# Patient Record
Sex: FEMALE | Race: WHITE | URBAN - METROPOLITAN AREA
[De-identification: names, ages, dates, MRNs, and addresses within clinical notes are randomized per-mention and may not be internally consistent; named-entity substitution may affect disease eponyms.]

---

## 2017-01-30 ENCOUNTER — OFFICE VISIT (OUTPATIENT)
Dept: FAMILY MEDICINE | Facility: CLINIC | Age: 67
End: 2017-01-30
Payer: COMMERCIAL

## 2017-01-30 VITALS
WEIGHT: 150.5 LBS | HEIGHT: 67 IN | SYSTOLIC BLOOD PRESSURE: 135 MMHG | BODY MASS INDEX: 23.62 KG/M2 | TEMPERATURE: 96.4 F | HEART RATE: 72 BPM | OXYGEN SATURATION: 96 % | DIASTOLIC BLOOD PRESSURE: 82 MMHG

## 2017-01-30 DIAGNOSIS — Z11.59 NEED FOR HEPATITIS C SCREENING TEST: ICD-10-CM

## 2017-01-30 DIAGNOSIS — Z23 NEED FOR PROPHYLACTIC VACCINATION AND INOCULATION AGAINST INFLUENZA: ICD-10-CM

## 2017-01-30 DIAGNOSIS — Z12.11 SCREEN FOR COLON CANCER: ICD-10-CM

## 2017-01-30 DIAGNOSIS — Z12.31 VISIT FOR SCREENING MAMMOGRAM: ICD-10-CM

## 2017-01-30 DIAGNOSIS — Z23 NEED FOR VACCINATION: ICD-10-CM

## 2017-01-30 DIAGNOSIS — E04.9 GOITER: ICD-10-CM

## 2017-01-30 DIAGNOSIS — M25.50 PAIN IN JOINT, MULTIPLE SITES: Primary | ICD-10-CM

## 2017-01-30 DIAGNOSIS — M15.0 PRIMARY OSTEOARTHRITIS INVOLVING MULTIPLE JOINTS: ICD-10-CM

## 2017-01-30 DIAGNOSIS — Z78.0 ASYMPTOMATIC POSTMENOPAUSAL STATUS: ICD-10-CM

## 2017-01-30 DIAGNOSIS — Z23 NEED FOR PROPHYLACTIC VACCINATION AGAINST STREPTOCOCCUS PNEUMONIAE (PNEUMOCOCCUS): ICD-10-CM

## 2017-01-30 DIAGNOSIS — R10.13 EPIGASTRIC PAIN: ICD-10-CM

## 2017-01-30 PROCEDURE — 90471 IMMUNIZATION ADMIN: CPT | Performed by: INTERNAL MEDICINE

## 2017-01-30 PROCEDURE — 99213 OFFICE O/P EST LOW 20 MIN: CPT | Mod: 25 | Performed by: INTERNAL MEDICINE

## 2017-01-30 PROCEDURE — 90670 PCV13 VACCINE IM: CPT | Performed by: INTERNAL MEDICINE

## 2017-01-30 RX ORDER — CALCIUM CARBONATE 500(1250)
500 TABLET ORAL 2 TIMES DAILY
COMMUNITY

## 2017-01-30 RX ORDER — MULTIPLE VITAMINS W/ MINERALS TAB 9MG-400MCG
1 TAB ORAL DAILY
COMMUNITY

## 2017-01-30 NOTE — NURSING NOTE
"Chief Complaint   Patient presents with     Establish Care     Cough     Arthritis       Initial /82 mmHg  Pulse 72  Temp(Src) 96.4  F (35.8  C) (Tympanic)  Ht 5' 7\" (1.702 m)  Wt 150 lb 8 oz (68.266 kg)  BMI 23.57 kg/m2  SpO2 96%  Breastfeeding? No Estimated body mass index is 23.57 kg/(m^2) as calculated from the following:    Height as of this encounter: 5' 7\" (1.702 m).    Weight as of this encounter: 150 lb 8 oz (68.266 kg).  BP completed using cuff size: large(CORTNEY)      SHIRLEY Driver MA January 30, 2017 10:57 AM      "

## 2017-01-30 NOTE — MR AVS SNAPSHOT
After Visit Summary   1/30/2017    Ileana Cohen    MRN: [id number]           Patient Information     Date Of Birth          1950        Visit Information        Provider Department      1/30/2017 11:00 AM Isaías Jay MD [facility]        Today's Diagnoses     Pain in joint, multiple sites    -  1     Goiter         Primary osteoarthritis involving multiple joints         Screen for colon cancer         Asymptomatic postmenopausal status         Visit for screening mammogram         Need for hepatitis C screening test         Need for prophylactic vaccination and inoculation against influenza         Need for prophylactic vaccination against Streptococcus pneumoniae (pneumococcus)            Follow-ups after your visit        Additional Services     GASTROENTEROLOGY ADULT REF PROCEDURE ONLY       Last Lab Result: CREATININE (mg/dL)       Date                     Value                 06/28/2016               0.82             ----------  Body mass index is 23.57 kg/(m^2).     Needed:  No  Language:  English    Patient will be contacted to schedule procedure.     Please be aware that coverage of these services is subject to the terms and limitations of your health insurance plan.  Call member services at your health plan with any benefit or coverage questions.  Any procedures must be performed at a [place] facility OR coordinated by your clinic's referral office.    Please bring the following with you to your appointment:    (1) Any X-Rays, CTs or MRIs which have been performed.  Contact the facility where they were done to arrange for  prior to your scheduled appointment.    (2) List of current medications   (3) This referral request   (4) Any documents/labs given to you for this referral                  Who to contact     If you have questions or need follow up information about today's clinic visit or your schedule please contact [facility]  "directly at [phone].  Normal or non-critical lab and imaging results will be communicated to you by [employer], letter or phone within 4 business days after the clinic has received the results. If you do not hear from us within 7 days, please contact the clinic through [employer] or phone. If you have a critical or abnormal lab result, we will notify you by phone as soon as possible.  Submit refill requests through [employer] or call your pharmacy and they will forward the refill request to us. Please allow 3 business days for your refill to be completed.          Additional Information About Your Visit        [employer] Information     [employer] lets you send messages to your doctor, view your test results, renew your prescriptions, schedule appointments and more. To sign up, go to [email or web address] . Click on \"Log in\" on the left side of the screen, which will take you to the Welcome page. Then click on \"Sign up Now\" on the right side of the page.     You will be asked to enter the access code listed below, as well as some personal information. Please follow the directions to create your username and password.     Your access code is: [email or web address]  Expires: 2017 11:51 AM     Your access code will  in 90 days. If you need help or a new code, please call your [place] clinic or [phone].        Care EveryWhere ID     This is your Care EveryWhere ID. This could be used by other organizations to access your [place] medical records  [id number]        Your Vitals Were     Pulse Temperature Height BMI (Body Mass Index) Pulse Oximetry Breastfeeding?    72 96.4  F (35.8  C) (Tympanic) 5' 7\" (1.702 m) 23.57 kg/m2 96% No       Blood Pressure from Last 3 Encounters:   17 135/82    Weight from Last 3 Encounters:   17 150 lb 8 oz (68.266 kg)              We Performed the Following     GASTROENTEROLOGY ADULT REF PROCEDURE ONLY        Primary Care Provider    None Specified       No primary provider on " file.        Thank you!     Thank you for choosing [facility]  for your care. Our goal is always to provide you with excellent care. Hearing back from our patients is one way we can continue to improve our services. Please take a few minutes to complete the written survey that you may receive in the mail after your visit with us. Thank you!             Your Updated Medication List - Protect others around you: Learn how to safely use, store and throw away your medicines at www.disposemymeds.org.          This list is accurate as of: 1/30/17 11:51 AM.  Always use your most recent med list.                   Brand Name Dispense Instructions for use    biotin 2.5 mg/mL      Take by mouth daily       calcium carbonate 500 MG tablet    OS-MIKE 500 mg [place]. Ca     Take 500 mg by mouth 2 times daily       Multi-vitamin Tabs tablet      Take 1 tablet by mouth daily

## 2017-01-30 NOTE — PROGRESS NOTES
SUBJECTIVE:                                                    Ileana Cohen is a 66 year old female who presents to clinic today for the following health issues:      New Patient/Transfer of Care    Chief Complaint   Patient presents with     Establish Care     Cough     Arthritis     This is a pleasant 66-year-old female who tells me that she is going to move to [place] in the next few months. She was a former patient of Dr. Jose Chacon. She has multiple complaints. She describes a several year history of progressive pain, swelling, stiffness in her distal interphalangeal joints of her bilateral upper extremities. She complained of these symptoms to Dr. Chacon who diagnosed her with osteoarthritis. Also, she complains of an improving cough that has been present since the onset of a severe upper respiratory illness several weeks ago. The cough is not productive of sputum. She has multiple other complaints including intermittent epigastric abdominal pain that seems to affect her when she lays flat in her bed. The pain is mild and is not associated with weight loss, nausea, vomiting, blood in stools, melena, hematemesis.    Problem list and histories reviewed & adjusted, as indicated.  Additional history: as documented    Patient Active Problem List   Diagnosis     Goiter     Primary osteoarthritis involving multiple joints     History reviewed. No pertinent past surgical history.    Social History   Substance Use Topics     Smoking status: Never Smoker      Smokeless tobacco: Not on file     Alcohol Use: 1.8 oz/week     3 Standard drinks or equivalent per week     Family History   Problem Relation Age of Onset     Hyperlipidemia Mother      Other Cancer Mother      OSTEOPOROSIS Mother      Thyroid Disease Mother      Other Cancer Father      Other Cancer Sister      Other Cancer Sister          Current Outpatient Prescriptions   Medication Sig Dispense Refill     multivitamin, therapeutic with minerals  "(MULTI-VITAMIN) TABS tablet Take 1 tablet by mouth daily       calcium carbonate (OS-MIKE 500 MG [place]. [place]) 500 MG tablet Take 500 mg by mouth 2 times daily       biotin 2.5 mg/mL Take by mouth daily       Allergies   Allergen Reactions     Latex Rash       ROS:  Constitutional, HEENT, cardiovascular, pulmonary, gi and gu systems are negative, except as otherwise noted.    OBJECTIVE:                                                    /82 mmHg  Pulse 72  Temp(Src) 96.4  F (35.8  C) (Tympanic)  Ht 5' 7\" (1.702 m)  Wt 150 lb 8 oz (68.266 kg)  BMI 23.57 kg/m2  SpO2 96%  Breastfeeding? No  Body mass index is 23.57 kg/(m^2).  GENERAL: healthy, alert and no distress  EYES: Eyes grossly normal to inspection, PERRL and conjunctivae and sclerae normal  HENT: ear canals and TM's normal, nose and mouth without ulcers or lesions  NECK: no adenopathy, no asymmetry, masses, or scars and thyroid normal to palpation  RESP: lungs clear to auscultation - no rales, rhonchi or wheezes  CV: regular rate and rhythm, normal S1 S2, no S3 or S4, no murmur, click or rub, no peripheral edema and peripheral pulses strong  ABDOMEN: soft, nontender, no hepatosplenomegaly, no masses and bowel sounds normal  MS: no gross musculoskeletal defects noted, no edema; she has changes consistent with osteoarthritis in her bilateral DIP joints of her upper extremities  SKIN: no suspicious lesions or rashes  NEURO: Normal strength and tone, mentation intact and speech normal  PSYCH: mentation appears normal, affect normal/bright    Diagnostic Test Results:  none      ASSESSMENT/PLAN:                                                            1. Pain in joint, multiple sites  I suspect that her pain in her joints is related to osteoarthritis, but I suggested that she could have a rheumatoid factor and CCP drawn to further risk stratify for inflammatory arthritis. She declined that recommendation. I told her she could call me if she wanted a " referral to hand therapy to manage symptoms.    2. Goiter  Previously, she had been on suppressive levothyroxine for this but not for the last several years. She is not bothered by her goiter.    3. Epigastric pain  These symptoms seem most consistent with gastroesophageal reflux disease. I suggested that she try ranitidine over-the-counter at bedtime as needed for these symptoms. If that addresses her symptoms, then no further evaluation may be needed, but if that does not sufficiently address her symptoms, then we should consider EGD here or in [place] if she moves.    4. Primary osteoarthritis involving multiple joints      5. Screen for colon cancer    - GASTROENTEROLOGY ADULT REF PROCEDURE ONLY    6. Asymptomatic postmenopausal status      7. Visit for screening mammogram  She is reminded that she is due for a mammogram    8. Need for hepatitis C screening test  With  Draw    9. Need for prophylactic vaccination and inoculation against influenza  She declined this vaccine today    10. Need for prophylactic vaccination against Streptococcus pneumoniae (pneumococcus)      11. Need for vaccination    - Pneumococcal vaccine 13 valent PCV13 IM (Prevnar) [35810]  - 1st  Administration  [[id number]]    FUTURE APPOINTMENTS:       - Follow-up visit in if she doesn't move to [place], she should return to see me in 3 months to follow-up on these symptoms or sooner if needed.    Isaías Jay MD  [facility]

## 2017-02-06 ENCOUNTER — OFFICE VISIT (OUTPATIENT)
Dept: URGENT CARE | Facility: URGENT CARE | Age: 67
End: 2017-02-06
Payer: COMMERCIAL

## 2017-02-06 VITALS
WEIGHT: 150 LBS | TEMPERATURE: 97.6 F | SYSTOLIC BLOOD PRESSURE: 128 MMHG | HEART RATE: 77 BPM | BODY MASS INDEX: 23.49 KG/M2 | OXYGEN SATURATION: 96 % | DIASTOLIC BLOOD PRESSURE: 78 MMHG

## 2017-02-06 DIAGNOSIS — R22.0 LIP SWELLING: ICD-10-CM

## 2017-02-06 DIAGNOSIS — T78.40XA ALLERGIC REACTION, INITIAL ENCOUNTER: Primary | ICD-10-CM

## 2017-02-06 PROCEDURE — 99214 OFFICE O/P EST MOD 30 MIN: CPT | Mod: 25 | Performed by: FAMILY MEDICINE

## 2017-02-06 PROCEDURE — 96372 THER/PROPH/DIAG INJ SC/IM: CPT | Performed by: FAMILY MEDICINE

## 2017-02-06 RX ORDER — EPINEPHRINE 0.3 MG/.3ML
0.3 INJECTION SUBCUTANEOUS
Qty: 0.6 ML | Refills: 0 | Status: SHIPPED | OUTPATIENT
Start: 2017-02-06 | End: 2017-08-28

## 2017-02-06 RX ORDER — CETIRIZINE HYDROCHLORIDE 10 MG/1
TABLET ORAL
Qty: 1 TABLET | Refills: 0
Start: 2017-02-06 | End: 2017-02-07

## 2017-02-06 RX ORDER — EPINEPHRINE 0.3 MG/.3ML
INJECTION SUBCUTANEOUS
Qty: 0.3 ML | Refills: 0
Start: 2017-02-06 | End: 2017-02-07

## 2017-02-06 RX ORDER — METHYLPREDNISOLONE SODIUM SUCCINATE 125 MG/2ML
125 INJECTION, POWDER, LYOPHILIZED, FOR SOLUTION INTRAMUSCULAR; INTRAVENOUS ONCE
Qty: 2 ML | Refills: 0 | OUTPATIENT
Start: 2017-02-06 | End: 2017-02-06

## 2017-02-06 NOTE — MR AVS SNAPSHOT
After Visit Summary   2/6/2017    Ileana Cohen    MRN: [id number]           Patient Information     Date Of Birth          1950        Visit Information        Provider Department      2/6/2017 10:15 AM Lito Rinaldi DO [facility]        Today's Diagnoses     Allergic reaction, initial encounter    -  1     Lip swelling            Follow-ups after your visit        Additional Services     ALLERGY/ASTHMA ADULT REFERRAL       Your provider has referred you to: [facility] - [phone]  [email or web address]  FHN: [facility] - [place] [phone]  Fax:  [phone] [email or web address]  FHN: [facility] - [place] [phone]   [email or web address]  N: [facility] - [place] [phone]   [email or web address]  [place] [phone]   [email or web address]    Please be aware that coverage of these services is subject to the terms and limitations of your health insurance plan.  Call member services at your health plan with any benefit or coverage questions.      Please bring the following with you to your appointment:    (1) Any X-Rays, CTs or MRIs which have been performed.  Contact the facility where they were done to arrange for  prior to your scheduled appointment.    (2) List of current medications  (3) This referral request   (4) Any documents/labs given to you for this referral                  Your next 10 appointments already scheduled     Feb 06, 2017  1:00 PM   Office Visit with FIDEL Marquez CNP   [facility] ([facility])    [address] [place] [address] [phone]           Bring a current list of meds and any records pertaining to this visit.  For Physicals, please bring immunization records  "and any forms needing to be filled out.  Please arrive 10 minutes early to complete paperwork.              Who to contact     If you have questions or need follow up information about today's clinic visit or your schedule please contact [facility] directly at [phone].  Normal or non-critical lab and imaging results will be communicated to you by MyChart, letter or phone within 4 business days after the clinic has received the results. If you do not hear from us within 7 days, please contact the clinic through MyChart or phone. If you have a critical or abnormal lab result, we will notify you by phone as soon as possible.  Submit refill requests through [employer] or call your pharmacy and they will forward the refill request to us. Please allow 3 business days for your refill to be completed.          Additional Information About Your Visit        MyChart Information     [employer] lets you send messages to your doctor, view your test results, renew your prescriptions, schedule appointments and more. To sign up, go to [email or web address] . Click on \"Log in\" on the left side of the screen, which will take you to the Welcome page. Then click on \"Sign up Now\" on the right side of the page.     You will be asked to enter the access code listed below, as well as some personal information. Please follow the directions to create your username and password.     Your access code is: [email or web address]  Expires: 2017 11:51 AM     Your access code will  in 90 days. If you need help or a new code, please call your [place] clinic or [phone].        Care EveryWhere ID     This is your Care EveryWhere ID. This could be used by other organizations to access your [place] medical records  [id number]        Your Vitals Were     Pulse Temperature Pulse Oximetry             77 97.6  F (36.4  C) (Oral) 96%          Blood Pressure from Last 3 Encounters:   17 128/78   17 135/82    " Weight from Last 3 Encounters:   02/06/17 150 lb (68.04 kg)   01/30/17 150 lb 8 oz (68.266 kg)              We Performed the Following     ALLERGY/ASTHMA ADULT REFERRAL     C ADRENALIN EPINEPHRINE 0.1 MG INJECTION     INJECTION INTRAMUSCULAR OR SUB-Q     INJECTION INTRAMUSCULAR OR SUB-Q     METHYLPRED 125 MG SOD INJ          Today's Medication Changes          These changes are accurate as of: 2/6/17 11:45 AM.  If you have any questions, ask your nurse or doctor.               Start taking these medicines.        Dose/Directions    cetirizine 10 MG tablet   Commonly known as:  zyrTEC   Used for:  Allergic reaction, initial encounter, Lip swelling   Started by:  Lito Rinaldi DO        1 tab po in clinic   Quantity:  1 tablet   Refills:  0       * EPINEPHrine 0.3 MG/0.3ML injection   Used for:  Allergic reaction, initial encounter, Lip swelling   Started by:  Lito Rinaldi DO        1 epipen in clinic   Quantity:  0.3 mL   Refills:  0       * EPINEPHrine 0.3 MG/0.3ML injection   Commonly known as:  EPIPEN 2-UMER   Used for:  Allergic reaction, initial encounter, Lip swelling   Started by:  Lito Rinaldi DO        Dose:  0.3 mg   Inject 0.3 mLs (0.3 mg) into the muscle once as needed for anaphylaxis   Quantity:  0.6 mL   Refills:  0       methylPREDNISolone sodium succinate 125 mg/2 mL injection   Commonly known as:  solu-MEDROL   Used for:  Allergic reaction, initial encounter, Lip swelling   Started by:  Lito Rinaldi DO        Dose:  125 mg   Inject 2 mLs (125 mg) into the vein once for 1 dose   Quantity:  2 mL   Refills:  0       * Notice:  This list has 2 medication(s) that are the same as other medications prescribed for you. Read the directions carefully, and ask your doctor or other care provider to review them with you.         Where to get your medicines      These medications were sent to [facility]/pharmacy #[id number] - [place] - [address] [id number] [facility] [address]      Phone:  [phone]    - EPINEPHrine 0.3 MG/0.3ML injection      Some of these will need a paper prescription and others can be bought over the counter.  Ask your nurse if you have questions.     You don't need a prescription for these medications    - cetirizine 10 MG tablet  - EPINEPHrine 0.3 MG/0.3ML injection  - methylPREDNISolone sodium succinate 125 mg/2 mL injection             Primary Care Provider Office Phone # Fax #    Isaías Jay [phone]       [facility] [address] [facility] [address]        Thank you!     Thank you for choosing [facility]  for your care. Our goal is always to provide you with excellent care. Hearing back from our patients is one way we can continue to improve our services. Please take a few minutes to complete the written survey that you may receive in the mail after your visit with us. Thank you!             Your Updated Medication List - Protect others around you: Learn how to safely use, store and throw away your medicines at www.disposemymeds.org.          This list is accurate as of: 2/6/17 11:45 AM.  Always use your most recent med list.                   Brand Name Dispense Instructions for use    biotin 2.5 mg/mL      Take by mouth daily       calcium carbonate 500 MG tablet    OS-MIKE 500 mg [place]. Ca     Take 500 mg by mouth 2 times daily       cetirizine 10 MG tablet    zyrTEC    1 tablet    1 tab po in clinic       * EPINEPHrine 0.3 MG/0.3ML injection     0.3 mL    1 epipen in clinic       * EPINEPHrine 0.3 MG/0.3ML injection    EPIPEN 2-UMER    0.6 mL    Inject 0.3 mLs (0.3 mg) into the muscle once as needed for anaphylaxis       methylPREDNISolone sodium succinate 125 mg/2 mL injection    solu-MEDROL    2 mL    Inject 2 mLs (125 mg) into the vein once for 1 dose       Multi-vitamin Tabs tablet      Take 1 tablet by mouth daily       * Notice:  This list has 2 medication(s) that are the same as other  medications prescribed for you. Read the directions carefully, and ask your doctor or other care provider to review them with you.

## 2017-02-06 NOTE — NURSING NOTE
"Chief Complaint   Patient presents with     Derm Problem     rash on hands, wrist and arms spreading since 7:30 am this morning       Initial /78 mmHg  Pulse 77  Temp(Src) 97.6  F (36.4  C) (Oral)  Wt 150 lb (68.04 kg)  SpO2 96% Estimated body mass index is 23.49 kg/(m^2) as calculated from the following:    Height as of 1/30/17: 5' 7\" (1.702 m).    Weight as of this encounter: 150 lb (68.04 kg).  Medication Reconciliation: complete    "

## 2017-02-06 NOTE — PROGRESS NOTES
SUBJECTIVE:Ileana Cohen is a 66 year old female who presents to the clinic today for a rash and lip swelling.  Onset of rash was this am ago.   Rash is still present and worsening.   Location of the rash: generalized.  Associated symptoms include: itching.    Symptoms are moderate and rash seems to be worsening.  Therapies tried to improve the rash: none.  Previous history of a similar rash? No  Recent exposure history: none known    Past Medical History   Diagnosis Date     Cancer (H)      Skin     Primary osteoarthritis involving multiple joints 1/30/2017     Goiter      had been on suppressive levothyroxine in the past     Allergies   Allergen Reactions     Latex Rash     Social History   Substance Use Topics     Smoking status: Never Smoker      Smokeless tobacco: Not on file     Alcohol Use: 1.8 oz/week     3 Standard drinks or equivalent per week       ROS:CONSTITUTIONAL:NEGATIVE for fever, chills, change in weight  ENT/MOUTH: NEGATIVE for ear, mouth and throat problems but does have lip swelling  RESP:NEGATIVE for significant cough or SOB    EXAM: VITALS: /78 mmHg  Pulse 77  Temp(Src) 97.6  F (36.4  C) (Oral)  Wt 150 lb (68.04 kg)  SpO2 96%  General:healthy,alert,no distress    Location: generalized     Distribution: diffuse/patchy     Lesion grouping: bilateral     Lesion type: macular and hives     Color: red with no other findings  PERTINENT EXAM: GENERAL APPEARANCE: healthy, alert and no distress  EYES: EOMI,  PERRL, conjunctiva clear  NECK: supple, non-tender to palpation, no adenopathy noted  RESP: lungs clear to auscultation - no rales, rhonchi or wheezes  CV: regular rates and rhythm, normal S1 S2, no murmur noted      ICD-10-CM    1. Allergic reaction, initial encounter T78.40XA EPINEPHrine 0.3 MG/0.3ML injection     methylPREDNISolone sodium succinate (SOLU-MEDROL) 125 mg/2 mL injection     cetirizine (ZYRTEC) 10 MG tablet     INJECTION INTRAMUSCULAR OR SUB-Q     METHYLPRED 125 MG SOD  INJ     C ADRENALIN EPINEPHRINE 0.1 MG INJECTION     INJECTION INTRAMUSCULAR OR SUB-Q     ALLERGY/ASTHMA ADULT REFERRAL     EPINEPHrine (EPIPEN 2-UMER) 0.3 MG/0.3ML injection   2. Lip swelling R22.9 EPINEPHrine 0.3 MG/0.3ML injection     methylPREDNISolone sodium succinate (SOLU-MEDROL) 125 mg/2 mL injection     cetirizine (ZYRTEC) 10 MG tablet     METHYLPRED 125 MG SOD INJ     C ADRENALIN EPINEPHRINE 0.1 MG INJECTION     INJECTION INTRAMUSCULAR OR SUB-Q     ALLERGY/ASTHMA ADULT REFERRAL     EPINEPHrine (EPIPEN 2-UMER) 0.3 MG/0.3ML injection     Feel better, less redness/itching  Cont antihistamine  Follow-up with primary clinic if not improving

## 2017-06-01 ENCOUNTER — TELEPHONE (OUTPATIENT)
Dept: FAMILY MEDICINE | Facility: CLINIC | Age: 67
End: 2017-06-01

## 2017-06-26 ENCOUNTER — OFFICE VISIT (OUTPATIENT)
Dept: FAMILY MEDICINE | Facility: CLINIC | Age: 67
End: 2017-06-26
Payer: COMMERCIAL

## 2017-06-26 VITALS
BODY MASS INDEX: 24.01 KG/M2 | HEIGHT: 67 IN | OXYGEN SATURATION: 98 % | HEART RATE: 76 BPM | TEMPERATURE: 98.1 F | DIASTOLIC BLOOD PRESSURE: 87 MMHG | WEIGHT: 153 LBS | SYSTOLIC BLOOD PRESSURE: 148 MMHG

## 2017-06-26 DIAGNOSIS — S20.212A CONTUSION OF RIB, LEFT, INITIAL ENCOUNTER: Primary | ICD-10-CM

## 2017-06-26 PROCEDURE — 99214 OFFICE O/P EST MOD 30 MIN: CPT | Performed by: PHYSICIAN ASSISTANT

## 2017-06-26 NOTE — MR AVS SNAPSHOT
"              After Visit Summary   2017    Ileana Cohen    MRN: [id number]           Patient Information     Date Of Birth          1950        Visit Information        Provider Department      2017 2:30 PM Caitie Katz PA-C [facility]        Today's Diagnoses     Contusion of rib, left, initial encounter    -  1       Follow-ups after your visit        Who to contact     If you have questions or need follow up information about today's clinic visit or your schedule please contact [facility] directly at [phone].  Normal or non-critical lab and imaging results will be communicated to you by SelSaharahart, letter or phone within 4 business days after the clinic has received the results. If you do not hear from us within 7 days, please contact the clinic through SelSaharahart or phone. If you have a critical or abnormal lab result, we will notify you by phone as soon as possible.  Submit refill requests through [employer] or call your pharmacy and they will forward the refill request to us. Please allow 3 business days for your refill to be completed.          Additional Information About Your Visit        MyChart Information     [employer] lets you send messages to your doctor, view your test results, renew your prescriptions, schedule appointments and more. To sign up, go to [email or web address] . Click on \"Log in\" on the left side of the screen, which will take you to the Welcome page. Then click on \"Sign up Now\" on the right side of the page.     You will be asked to enter the access code listed below, as well as some personal information. Please follow the directions to create your username and password.     Your access code is: [email or web address]  Expires: 2017 10:53 PM     Your access code will  in 90 days. If you need help or a new code, please call your [facility] or [phone].        Care EveryWhere ID     This is your Care EveryWhere ID. This could be used " "by other organizations to access your [place] medical records  [id number]        Your Vitals Were     Pulse Temperature Height Pulse Oximetry Breastfeeding? BMI (Body Mass Index)    76 98.1  F (36.7  C) (Tympanic) 5' 7\" (1.702 m) 98% No 23.96 kg/m2       Blood Pressure from Last 3 Encounters:   06/26/17 148/87   02/06/17 128/78   01/30/17 135/82    Weight from Last 3 Encounters:   06/26/17 153 lb (69.4 kg)   02/06/17 150 lb (68 kg)   01/30/17 150 lb 8 oz (68.3 kg)              Today, you had the following     No orders found for display       Primary Care Provider Office Phone # Fax #    Isaías Jay [phone]       [facility] [address]  [facility] [address]        Equal Access to Services     JUD MORRIS : Hadii aad ku hadasho Soomaali, waaxda luqadaha, qaybta kaalmada adeegyada, waxay adalgisain haylorn tonya sanderson . So [facility] [phone].    ATENCIÓN: Si habla español, tiene a linder disposición servicios gratuitos de asistencia lingüística. Parul al [phone].    We comply with applicable federal civil rights laws and [place] laws. We do not discriminate on the basis of race, color, national origin, age, disability sex, sexual orientation or gender identity.            Thank you!     Thank you for choosing [facility]  for your care. Our goal is always to provide you with excellent care. Hearing back from our patients is one way we can continue to improve our services. Please take a few minutes to complete the written survey that you may receive in the mail after your visit with us. Thank you!             Your Updated Medication List - Protect others around you: Learn how to safely use, store and throw away your medicines at www.disposemymeds.org.          This list is accurate as of: 6/26/17 10:53 PM.  Always use your most recent med list.                   Brand Name Dispense Instructions for use Diagnosis    ALEVE PO           biotin 2.5 mg/mL Susp      " Take by mouth daily        calcium carbonate 1250 MG tablet    OS-MIKE 500 mg [place]. Ca     Take 500 mg by mouth 2 times daily        EPINEPHrine 0.3 MG/0.3ML injection    EPIPEN 2-UMER    0.6 mL    Inject 0.3 mLs (0.3 mg) into the muscle once as needed for anaphylaxis    Allergic reaction, initial encounter, Lip swelling       Multi-vitamin Tabs tablet      Take 1 tablet by mouth daily

## 2017-06-26 NOTE — NURSING NOTE
"Chief Complaint   Patient presents with     Fall     slipped on wet bathroom floor and hit left upper back very hard on bathtub. Still having pain and discomfort, having some trouble taking deep breaths. Wants to r/o rib fx.        Initial /87 (BP Location: Right arm, Cuff Size: Adult Regular)  Pulse 76  Temp 98.1  F (36.7  C) (Tympanic)  Ht 5' 7\" (1.702 m)  Wt 153 lb (69.4 kg)  SpO2 98%  Breastfeeding? No  BMI 23.96 kg/m2 Estimated body mass index is 23.96 kg/(m^2) as calculated from the following:    Height as of this encounter: 5' 7\" (1.702 m).    Weight as of this encounter: 153 lb (69.4 kg).  Medication Reconciliation: complete   Mi Coffman MA      "

## 2017-06-26 NOTE — PROGRESS NOTES
HPI: 67 yo female here with rib pain following fall on 6/15/17  Pt was sitting on the side of the tub washing her 6 yo grand dtrs hair and slipped on the floor of the tub and hit her back/L side of ribs on the side of the tub while visiting them in [place]  Denies any LOC or hitting her head  She had pain right away and couldn't take a deep breath the rest of the night, but then really felt fine for next 3-4 days.  She felt stiff the next day but started feeling better for days but then starting last week (3 days after flying home) started to increase in pain.  Friday was the worst.  She has been taking Aleve one daily so increased that to BID on Friday  She is also applying ice but still having pain.  She is not sob and comfortable at rest  Worse with position changes.  Denies cough or hemoptysis or fever.  She had a tiny bruise initially but that resolved.    She is retiring in Dec and moving to [place] since dtr and granddtr there    Past Medical History:   Diagnosis Date     Cancer (H)     Skin     Goiter     had been on suppressive levothyroxine in the past     Primary osteoarthritis involving multiple joints 1/30/2017     History reviewed. No pertinent surgical history.  Social History   Substance Use Topics     Smoking status: Never Smoker     Smokeless tobacco: Not on file     Alcohol use 1.8 oz/week     3 Standard drinks or equivalent per week     Current Outpatient Prescriptions   Medication Sig Dispense Refill     Naproxen Sodium (ALEVE PO)        EPINEPHrine (EPIPEN 2-UMER) 0.3 MG/0.3ML injection Inject 0.3 mLs (0.3 mg) into the muscle once as needed for anaphylaxis 0.6 mL 0     multivitamin, therapeutic with minerals (MULTI-VITAMIN) TABS tablet Take 1 tablet by mouth daily       calcium carbonate (OS-MIKE 500 MG [place]. CA) 500 MG tablet Take 500 mg by mouth 2 times daily       biotin 2.5 mg/mL Take by mouth daily       Allergies   Allergen Reactions     Latex Rash     FAMILY HISTORY NOTED AND REVIEWED    PHYSICAL  "EXAM:    /87 (BP Location: Right arm, Cuff Size: Adult Regular)  Pulse 76  Temp 98.1  F (36.7  C) (Tympanic)  Ht 5' 7\" (1.702 m)  Wt 153 lb (69.4 kg)  SpO2 98%  Breastfeeding? No  BMI 23.96 kg/m2    Patient appears non toxic  Lungs: CTA bilat without crackles.  Pt able to take deep breaths and appears comfortable.  L post rib there is a tiny yellow eccymosis and slight tenderness in that area.  Heart: RRR without m/r/g.  Extr: no edema.  Psych: approp affect and mood  Moves UE well    Assessment and Plan:     (S23.687L) Contusion of rib, left, initial encounter  (primary encounter diagnosis)  Comment:  Plan: recd aleve one bid continued for now, ice 20min QID and gentle stretching. Doubt rib fracture. Suspect she has some assoc muscle spasm now since back home sleeping on older/less firm mattress.  F/u if sxs worsen or persist.      Caitie Katz PA-C            "

## 2017-08-07 ENCOUNTER — HOSPITAL ENCOUNTER (OUTPATIENT)
Dept: MAMMOGRAPHY | Facility: CLINIC | Age: 67
Discharge: HOME | End: 2017-08-07
Attending: OBSTETRICS & GYNECOLOGY | Admitting: OBSTETRICS & GYNECOLOGY
Payer: MEDICARE

## 2017-08-07 DIAGNOSIS — Z12.31 VISIT FOR SCREENING MAMMOGRAM: ICD-10-CM

## 2017-08-07 PROCEDURE — 77063 BREAST TOMOSYNTHESIS BI: CPT

## 2017-08-28 ENCOUNTER — OFFICE VISIT (OUTPATIENT)
Dept: FAMILY MEDICINE | Facility: CLINIC | Age: 67
End: 2017-08-28
Payer: COMMERCIAL

## 2017-08-28 VITALS
BODY MASS INDEX: 23.79 KG/M2 | TEMPERATURE: 97.6 F | SYSTOLIC BLOOD PRESSURE: 150 MMHG | OXYGEN SATURATION: 99 % | DIASTOLIC BLOOD PRESSURE: 83 MMHG | WEIGHT: 151.6 LBS | HEIGHT: 67 IN | HEART RATE: 67 BPM

## 2017-08-28 DIAGNOSIS — Z01.818 PREOP GENERAL PHYSICAL EXAM: Primary | ICD-10-CM

## 2017-08-28 DIAGNOSIS — Z98.82 H/O BREAST AUGMENTATION: ICD-10-CM

## 2017-08-28 DIAGNOSIS — Z41.1 ENCOUNTER FOR BREAST AUGMENTATION: ICD-10-CM

## 2017-08-28 LAB — HGB BLD-MCNC: 12.8 G/DL (ref 11.7–15.7)

## 2017-08-28 PROCEDURE — 36415 COLL VENOUS BLD VENIPUNCTURE: CPT | Performed by: NURSE PRACTITIONER

## 2017-08-28 PROCEDURE — 99214 OFFICE O/P EST MOD 30 MIN: CPT | Performed by: NURSE PRACTITIONER

## 2017-08-28 PROCEDURE — 93000 ELECTROCARDIOGRAM COMPLETE: CPT | Performed by: NURSE PRACTITIONER

## 2017-08-28 PROCEDURE — 85018 HEMOGLOBIN: CPT | Performed by: NURSE PRACTITIONER

## 2017-08-28 NOTE — LETTER
[place] [address] [facility]  [address]  [place]  [address]  Tel: [phone]    August 28, 2017    Ileana Cohen  [address] [facility] [address]        Dear Ms. Cohen,    The results of your recent Hemoglobin were very good, Ileana!      Resulted Orders   Hemoglobin   Result Value Ref Range    Hemoglobin 12.8 11.7 - 15.7 g/dL          If you have any further questions or problems, please contact our office.      Sincerely,    Jacqueline Titus NP / jona

## 2017-08-28 NOTE — MR AVS SNAPSHOT
After Visit Summary   8/28/2017    Ileana Cohen    MRN: [id number]           Patient Information     Date Of Birth          1950        Visit Information        Provider Department      8/28/2017 9:00 AM Jacqueline Titus APRN CNP [facility]        Today's Diagnoses     Preop general physical exam    -  1    H/O breast augmentation        Encounter for breast augmentation          Care Instructions      Before Your Surgery      Call your surgeon if there is any change in your health. This includes signs of a cold or flu (such as a sore throat, runny nose, cough, rash or fever).    Do not smoke, drink alcohol or take over the counter medicine (unless your surgeon or primary care doctor tells you to) for the 24 hours before and after surgery.    If you take prescribed drugs: Follow your doctor s orders about which medicines to take and which to stop until after surgery.    Eating and drinking prior to surgery: follow the instructions from your surgeon    Take a shower or bath the night before surgery. Use the soap your surgeon gave you to gently clean your skin. If you do not have soap from your surgeon, use your regular soap. Do not shave or scrub the surgery site.  Wear clean pajamas and have clean sheets on your bed.           Follow-ups after your visit        Who to contact     If you have questions or need follow up information about today's clinic visit or your schedule please contact [facility] directly at [phone].  Normal or non-critical lab and imaging results will be communicated to you by MyChart, letter or phone within 4 business days after the clinic has received the results. If you do not hear from us within 7 days, please contact the clinic through MyChart or phone. If you have a critical or abnormal lab result, we will notify you by phone as soon as possible.  Submit refill requests through [employer] or call your pharmacy and they will forward the  "refill request to us. Please allow 3 business days for your refill to be completed.          Additional Information About Your Visit        MyChart Information     [employer] lets you send messages to your doctor, view your test results, renew your prescriptions, schedule appointments and more. To sign up, go to [email or web address] . Click on \"Log in\" on the left side of the screen, which will take you to the Welcome page. Then click on \"Sign up Now\" on the right side of the page.     You will be asked to enter the access code listed below, as well as some personal information. Please follow the directions to create your username and password.     Your access code is: [email or web address]  Expires: 2017 10:53 PM     Your access code will  in 90 days. If you need help or a new code, please call your [place] clinic or [phone].        Care EveryWhere ID     This is your Care EveryWhere ID. This could be used by other organizations to access your [place] medical records  [id number]        Your Vitals Were     Pulse Temperature Height Pulse Oximetry Breastfeeding? BMI (Body Mass Index)    67 97.6  F (36.4  C) (Oral) 5' 7\" (1.702 m) 99% No 23.74 kg/m2       Blood Pressure from Last 3 Encounters:   17 150/83   17 148/87   17 128/78    Weight from Last 3 Encounters:   17 151 lb 9.6 oz (68.8 kg)   17 153 lb (69.4 kg)   17 150 lb (68 kg)              We Performed the Following     EKG 12-lead complete w/read - Clinics     Hemoglobin          Today's Medication Changes          These changes are accurate as of: 17 10:55 AM.  If you have any questions, ask your nurse or doctor.               Stop taking these medicines if you haven't already. Please contact your care team if you have questions.     EPINEPHrine 0.3 MG/0.3ML injection 2-pack   Commonly known as:  EPIPEN 2-UMER   Stopped by:  Jacqueline Titus APRN CNP                    Primary Care Provider Office Phone # Fax " #    Isaías C MD Misty [phone]       [facility] [address]  [facility] [address]        Equal Access to Services     REANNA MORRIS : Hadii barbi ku hadrizwano Sobernardoali, waaxda luqadaha, qaybta kaalmada adeegyada, katharine garcia laJookya toney. So [facility] [phone].    ATENCIÓN: Si habla español, tiene a linder disposición servicios gratuitos de asistencia lingüística. Llame al [phone].    We comply with applicable federal civil rights laws and [place] laws. We do not discriminate on the basis of race, color, national origin, age, disability sex, sexual orientation or gender identity.            Thank you!     Thank you for choosing [facility]  for your care. Our goal is always to provide you with excellent care. Hearing back from our patients is one way we can continue to improve our services. Please take a few minutes to complete the written survey that you may receive in the mail after your visit with us. Thank you!             Your Updated Medication List - Protect others around you: Learn how to safely use, store and throw away your medicines at www.disposemymeds.org.          This list is accurate as of: 8/28/17 10:55 AM.  Always use your most recent med list.                   Brand Name Dispense Instructions for use Diagnosis    ALEVE PO           biotin 2.5 mg/mL Susp      Take by mouth daily        calcium carbonate 1250 MG tablet    OS-MIKE 500 mg [place]. Ca     Take 500 mg by mouth 2 times daily        Multi-vitamin Tabs tablet      Take 1 tablet by mouth daily

## 2017-08-28 NOTE — PROGRESS NOTES
[address] [address]  [phone]  Dept: [phone]    PRE-OP EVALUATION:  Today's date: 2017    Ileana Cohen (: 1950) presents for pre-operative evaluation assessment as requested by Dr. Elias Hull.  She requires evaluation and anesthesia risk assessment prior to undergoing surgery/procedure for treatment of implants .  Proposed procedure: breast implants    Date of Surgery/ Procedure: 17  Time of Surgery/ Procedure: 1000  Hospital/Surgical Facility: [facility]  Fax number for surgical facility: [phone]  Primary Physician: Isaías Jay  Type of Anesthesia Anticipated: General    Patient has a Health Care Directive or Living Will:  NO    1. NO - Do you have a history of heart attack, stroke, stent, bypass or surgery on an artery in the head, neck, heart or legs?  2. NO - Do you ever have any pain or discomfort in your chest?  3. NO - Do you have a history of  Heart Failure?  4. NO - Are you troubled by shortness of breath when: walking on the level, up a slight hill or at night?  5. NO - Do you currently have a cold, bronchitis or other respiratory infection?  6. NO - Do you have a cough, shortness of breath or wheezing?  7. NO - Do you sometimes get pains in the calves of your legs when you walk?  8. NO - Do you or anyone in your family have previous history of blood clots?  9. NO - Do you or does anyone in your family have a serious bleeding problem such as prolonged bleeding following surgeries or cuts?  10. NO - Have you ever had problems with anemia or been told to take iron pills?  11. NO - Have you had any abnormal blood loss such as black, tarry or bloody stools, or abnormal vaginal bleeding?  12. NO - Have you ever had a blood transfusion?  13. NO - Have you or any of your relatives ever had problems with anesthesia?  14. NO - Do you have sleep apnea, excessive snoring or daytime drowsiness?  15. NO - Do you  have any prosthetic heart valves?  16. NO - Do you have prosthetic joints?  17. NO - Is there any chance that you may be pregnant?        HPI:                                                      Brief HPI related to upcoming procedure: original rohan breast augmentation 1994.  Left breast implant failure 2006        See problem list for active medical problems.  Problems all longstanding and stable, except as noted/documented.  See ROS for pertinent symptoms related to these conditions.                                                                                                  .    MEDICAL HISTORY:                                                    Patient Active Problem List    Diagnosis Date Noted     Contusion of rib, left, initial encounter 06/26/2017     Priority: Medium     Goiter 01/30/2017     Priority: Medium     Primary osteoarthritis involving multiple joints 01/30/2017     Priority: Medium      Past Medical History:   Diagnosis Date     Cancer (H)     Skin     Goiter     had been on suppressive levothyroxine in the past     Primary osteoarthritis involving multiple joints 1/30/2017     PAST SURGICAL HISTORY  Bilateral breast implant  Tubal ligation    Current Outpatient Prescriptions   Medication Sig Dispense Refill     Naproxen Sodium (ALEVE PO)        EPINEPHrine (EPIPEN 2-UMER) 0.3 MG/0.3ML injection Inject 0.3 mLs (0.3 mg) into the muscle once as needed for anaphylaxis 0.6 mL 0     multivitamin, therapeutic with minerals (MULTI-VITAMIN) TABS tablet Take 1 tablet by mouth daily       calcium carbonate (OS-MIKE 500 MG [place]. CA) 500 MG tablet Take 500 mg by mouth 2 times daily       biotin 2.5 mg/mL Take by mouth daily       OTC products: None, except as noted above    Allergies   Allergen Reactions     Latex Rash      Latex Allergy: bandaid allergy    Social History   Substance Use Topics     Smoking status: Never Smoker     Smokeless tobacco: Not on file     Alcohol use 1.8 oz/week     3 Standard  "drinks or equivalent per week     History   Drug Use No       REVIEW OF SYSTEMS:                                                    Constitutional, neuro, ENT-mild allergic rhinitis, endocrine, pulmonary, cardiac, gastrointestinal, genitourinary, musculoskeletal-osteoarthritis of shoulders and hands, integument and psychiatric systems are negative, except as otherwise noted.      EXAM:                                                    /83 (BP Location: Right arm, Patient Position: Chair, Cuff Size: Adult Regular)  Pulse 67  Temp 97.6  F (36.4  C) (Oral)  Ht 5' 7\" (1.702 m)  Wt 151 lb 9.6 oz (68.8 kg)  SpO2 99%  Breastfeeding? No  BMI 23.74 kg/m2      GENERAL APPEARANCE: healthy, alert and no distress     EYES: EOMI, PERRL     HENT: ear canals and TM's normal and nose and mouth without ulcers or lesions     NECK: no adenopathy, no asymmetry, masses, or scars and thyroid normal to palpation     RESP: lungs clear to auscultation - no rales, rhonchi or wheezes     CV: regular rates and rhythm, normal S1 S2, no S3 or S4 and no murmur, click or rub     ABDOMEN:  soft, nontender, no HSM or masses and bowel sounds normal     MS: extremities normal- no gross deformities noted, no evidence of inflammation in joints, FROM in all extremities.     SKIN: no suspicious lesions or rashes     NEURO: Normal strength and tone, sensory exam grossly normal, mentation intact and speech normal     PSYCH: mentation appears normal. and affect normal/bright    DIAGNOSTICS:                                                    EKG:  WNL    Recent Labs   Lab Test 06/28/16   POTASSIUM  4.0   CR  0.82   A1C  5.7      Results for orders placed or performed in visit on 08/28/17 (from the past 24 hour(s))   Hemoglobin   Result Value Ref Range    Hemoglobin 12.8 11.7 - 15.7 g/dL     IMPRESSION:                                                    Reason for surgery/procedure: bilateral breast implant removal and redo   Diagnosis/reason for " consult: pre op consulst    The proposed surgical procedure is considered INTERMEDIATE risk.    REVISED CARDIAC RISK INDEX  The patient has the following serious cardiovascular risks for perioperative complications such as (MI, PE, VFib and 3  AV Block):  No serious cardiac risks  INTERPRETATION: 0 risks: Class I (very low risk - 0.4% complication rate)    The patient has the following additional risks for perioperative complications:  No identified additional risks        RECOMMENDATIONS:                                                          --Patient has discontinued all medications and supplements and vitamins and NSAIDs as of 8/25  APPROVAL GIVEN to proceed with proposed procedure, without further diagnostic evaluation       Signed Electronically by: FIDEL Marquez CNP    Copy of this evaluation report is provided to requesting physician.    [facility] Preop Guidelines

## 2017-08-28 NOTE — NURSING NOTE
"Chief Complaint   Patient presents with     Pre-Op Exam       Initial /83 (BP Location: Right arm, Patient Position: Chair, Cuff Size: Adult Regular)  Pulse 67  Temp 97.6  F (36.4  C) (Oral)  Ht 5' 7\" (1.702 m)  Wt 151 lb 9.6 oz (68.8 kg)  SpO2 99%  Breastfeeding? No  BMI 23.74 kg/m2 Estimated body mass index is 23.74 kg/(m^2) as calculated from the following:    Height as of this encounter: 5' 7\" (1.702 m).    Weight as of this encounter: 151 lb 9.6 oz (68.8 kg).  Medication Reconciliation: complete.  Yasmeen De Leon CMA    "

## 2017-09-29 ENCOUNTER — OFFICE VISIT (OUTPATIENT)
Dept: FAMILY MEDICINE | Facility: CLINIC | Age: 67
End: 2017-09-29
Payer: MEDICARE

## 2017-09-29 VITALS
TEMPERATURE: 97.7 F | OXYGEN SATURATION: 96 % | BODY MASS INDEX: 24.17 KG/M2 | WEIGHT: 154 LBS | DIASTOLIC BLOOD PRESSURE: 78 MMHG | HEART RATE: 75 BPM | HEIGHT: 67 IN | SYSTOLIC BLOOD PRESSURE: 147 MMHG

## 2017-09-29 DIAGNOSIS — M65.4 RADIAL STYLOID TENOSYNOVITIS: Primary | ICD-10-CM

## 2017-09-29 DIAGNOSIS — Z23 NEED FOR PROPHYLACTIC VACCINATION AND INOCULATION AGAINST INFLUENZA: ICD-10-CM

## 2017-09-29 DIAGNOSIS — I10 BENIGN ESSENTIAL HYPERTENSION: ICD-10-CM

## 2017-09-29 PROCEDURE — G0008 ADMIN INFLUENZA VIRUS VAC: HCPCS | Performed by: NURSE PRACTITIONER

## 2017-09-29 PROCEDURE — 99214 OFFICE O/P EST MOD 30 MIN: CPT | Mod: 25 | Performed by: NURSE PRACTITIONER

## 2017-09-29 PROCEDURE — 90662 IIV NO PRSV INCREASED AG IM: CPT | Performed by: NURSE PRACTITIONER

## 2017-09-29 RX ORDER — IBUPROFEN 200 MG
400 TABLET ORAL EVERY 4 HOURS PRN
COMMUNITY

## 2017-09-29 RX ORDER — HYDROCHLOROTHIAZIDE 12.5 MG/1
12.5 TABLET ORAL DAILY
Qty: 30 TABLET | Refills: 1 | Status: SHIPPED | OUTPATIENT
Start: 2017-09-29 | End: 2017-10-16

## 2017-09-29 NOTE — NURSING NOTE
"Chief Complaint   Patient presents with     Wrist Injury     Flu Shot       Initial /78 (BP Location: Left arm, Cuff Size: Adult Large)  Pulse 75  Temp 97.7  F (36.5  C) (Tympanic)  Ht 5' 7\" (1.702 m)  Wt 154 lb (69.9 kg)  SpO2 96%  Breastfeeding? No  BMI 24.12 kg/m2 Estimated body mass index is 24.12 kg/(m^2) as calculated from the following:    Height as of this encounter: 5' 7\" (1.702 m).    Weight as of this encounter: 154 lb (69.9 kg).  Medication Reconciliation: complete  Mi Coffman MA      "

## 2017-09-29 NOTE — PATIENT INSTRUCTIONS
Begin using aleve 220mg  1-2 pills twice a day with food  Continue icing 20 min three times a day  Wear wrist splint to rest the wrist     Begin the diuretic once daily in the morning  Reduce salt in your diet  Follow up in clinic in 2 weeks to recheck BP  And have blood work done

## 2017-09-29 NOTE — MR AVS SNAPSHOT
After Visit Summary   9/29/2017    Ileana Cohen    MRN: [id number]           Patient Information     Date Of Birth          1950        Visit Information        Provider Department      9/29/2017 1:30 PM Jacqueline Titus APRN CNP [facility]        Today's Diagnoses     Benign essential hypertension    -  1    Need for prophylactic vaccination and inoculation against influenza          Care Instructions    Begin using aleve 220mg  1-2 pills twice a day with food  Continue icing 20 min three times a day  Wear wrist splint to rest the wrist     Begin the diuretic once daily in the morning  Reduce salt in your diet  Follow up in clinic in 2 weeks to recheck BP  And have blood work done          Follow-ups after your visit        Future tests that were ordered for you today     Open Future Orders        Priority Expected Expires Ordered    Basic metabolic panel  (Ca, Cl, CO2, Creat, Gluc, K, Na, BUN) Routine 10/5/2017 9/29/2018 9/29/2017            Who to contact     If you have questions or need follow up information about today's clinic visit or your schedule please contact [facility] directly at [phone].  Normal or non-critical lab and imaging results will be communicated to you by TidePoolhart, letter or phone within 4 business days after the clinic has received the results. If you do not hear from us within 7 days, please contact the clinic through Finderlyt or phone. If you have a critical or abnormal lab result, we will notify you by phone as soon as possible.  Submit refill requests through Innohub or call your pharmacy and they will forward the refill request to us. Please allow 3 business days for your refill to be completed.          Additional Information About Your Visit        TidePool[employer] Information     Innohub lets you send messages to your doctor, view your test results, renew your prescriptions, schedule appointments and more. To sign up, go to  [email or web address] . Click on \"Log in\" on the left side of the screen, which will take you to the Welcome page. Then click on \"Sign up Now\" on the right side of the page.     You will be asked to enter the access code listed below, as well as some personal information. Please follow the directions to create your username and password.     Your access code is: [email or web address]  Expires: 2017  1:46 PM     Your access code will  in 90 days. If you need help or a new code, please call your [place] clinic or [phone].        Care EveryWhere ID     This is your Care EveryWhere ID. This could be used by other organizations to access your [place] medical records  [id number]        Your Vitals Were     Pulse Temperature Height Pulse Oximetry Breastfeeding? BMI (Body Mass Index)    75 97.7  F (36.5  C) (Tympanic) 5' 7\" (1.702 m) 96% No 24.12 kg/m2       Blood Pressure from Last 3 Encounters:   17 147/78   17 150/83   17 148/87    Weight from Last 3 Encounters:   17 154 lb (69.9 kg)   17 151 lb 9.6 oz (68.8 kg)   17 153 lb (69.4 kg)              We Performed the Following          ADMIN VACCINE, FIRST [59321]     FLU VACCINE, INCREASED ANTIGEN, PRESV FREE, AGE 65+ [15471]          Today's Medication Changes          These changes are accurate as of: 17  1:58 PM.  If you have any questions, ask your nurse or doctor.               Start taking these medicines.        Dose/Directions    hydrochlorothiazide 12.5 MG Tabs tablet   Used for:  Benign essential hypertension   Started by:  Jacqueline Titus APRN CNP        Dose:  12.5 mg   Take 1 tablet (12.5 mg) by mouth daily   Quantity:  30 tablet   Refills:  1            Where to get your medicines      These medications were sent to [facility]/pharmacy #[id number] - [place], [place] - [address]  [address] [address]     Phone:  [phone]     hydrochlorothiazide 12.5 MG Tabs tablet                " Primary Care Provider Office Phone # Fax #    Isaías Jay [phone]       [facility] [address] [facility] [address]  [facility] [address]        Equal Access to Services     REANNA MORRIS : Hadii barbi ku martinao Sobernardoali, waaxda luqadaha, qaybta kaalmada adeegyada, waxay idiin hayaan tonya garcia maria e toney. So [facility] [phone].    ATENCIÓN: Si habla español, tiene a linder disposición servicios gratuitos de asistencia lingüística. Llame al [phone].    We comply with applicable federal civil rights laws and [place] laws. We do not discriminate on the basis of race, color, national origin, age, disability, sex, sexual orientation, or gender identity.            Thank you!     Thank you for choosing [facility]  for your care. Our goal is always to provide you with excellent care. Hearing back from our patients is one way we can continue to improve our services. Please take a few minutes to complete the written survey that you may receive in the mail after your visit with us. Thank you!             Your Updated Medication List - Protect others around you: Learn how to safely use, store and throw away your medicines at www.disposemymeds.org.          This list is accurate as of: 9/29/17  1:58 PM.  Always use your most recent med list.                   Brand Name Dispense Instructions for use Diagnosis    ALEVE PO           biotin 2.5 mg/mL Susp      Take by mouth daily        calcium carbonate 1250 MG tablet    OS-MIKE 500 mg [place]. Ca     Take 500 mg by mouth 2 times daily        hydrochlorothiazide 12.5 MG Tabs tablet     30 tablet    Take 1 tablet (12.5 mg) by mouth daily    Benign essential hypertension       ibuprofen 200 MG tablet    ADVIL/MOTRIN     Take 400 mg by mouth every 4 hours as needed for mild pain        Multi-vitamin Tabs tablet      Take 1 tablet by mouth daily

## 2017-09-29 NOTE — PROGRESS NOTES
"  SUBJECTIVE:   Ileana Cohen is a 67 year old female who presents to clinic today for the following health issues:    Musculoskeletal problem/pain      Duration: has had arthritis in her fingers and for the past 2 weeks has been noting some pain in the radial left wrist    Description  Location: Left wrist - heard and felt a \"pop\" yesterday when moving bedding. Has had sharp pain off and on since then.    Intensity:  10/10 when it flares up, 6-7/10 avgerage    Accompanying signs and symptoms: radiation of pain to thumb and forearm and slight swelling    History  Previous similar problem: no   Previous evaluation:  none    Precipitating or alleviating factors:  Trauma or overuse: no   Aggravating factors include: lifting, overuse and lifting/using left hand  Therapies tried and outcome: iceing    Also expressing concerns about her BP which has been running SBP>140 for several months and up to 160.  Denies headache, chest pain , fatigue or SOB  Admits to eating a lot of salt and being under a lot of stress with her work        Problem list and histories reviewed & adjusted, as indicated.  Additional history: as documented    Patient Active Problem List   Diagnosis     Goiter     Primary osteoarthritis involving multiple joints     Contusion of rib, left, initial encounter     Past Surgical History:   Procedure Laterality Date     MAMMOPLASTY AUGMENTATION BILATERAL Bilateral 1994     tuabl ligation Bilateral        Social History   Substance Use Topics     Smoking status: Never Smoker     Smokeless tobacco: Never Used     Alcohol use 1.8 oz/week     3 Standard drinks or equivalent per week     Family History   Problem Relation Age of Onset     Hyperlipidemia Mother      Other Cancer Mother      OSTEOPOROSIS Mother      Thyroid Disease Mother      Other Cancer Father      Other Cancer Sister      Other Cancer Sister          Current Outpatient Prescriptions   Medication Sig Dispense Refill     ibuprofen " "(ADVIL/MOTRIN) 200 MG tablet Take 400 mg by mouth every 4 hours as needed for mild pain       hydrochlorothiazide 12.5 MG TABS tablet Take 1 tablet (12.5 mg) by mouth daily 30 tablet 1     Naproxen Sodium (ALEVE PO)        multivitamin, therapeutic with minerals (MULTI-VITAMIN) TABS tablet Take 1 tablet by mouth daily       calcium carbonate (OS-MIKE 500 MG [place]. CA) 500 MG tablet Take 500 mg by mouth 2 times daily       biotin 2.5 mg/mL Take by mouth daily       Allergies   Allergen Reactions     Latex Rash         Reviewed and updated as needed this visit by clinical staff     Reviewed and updated as needed this visit by Provider         ROS:  Constitutional, HEENT, cardiovascular, pulmonary, gi and gu systems are negative, except as otherwise noted.      OBJECTIVE:   /78 (BP Location: Left arm, Cuff Size: Adult Large)  Pulse 75  Temp 97.7  F (36.5  C) (Tympanic)  Ht 5' 7\" (1.702 m)  Wt 154 lb (69.9 kg)  SpO2 96%  Breastfeeding? No  BMI 24.12 kg/m2  Body mass index is 24.12 kg/(m^2).  GENERAL: healthy, alert and minimal distress  EYES: Eyes grossly normal to inspection, PERRL and conjunctivae and sclerae normal  HENT: ear canals and TM's normal, nose and mouth without ulcers or lesions  NECK: no adenopathy, no asymmetry, masses, or scars and thyroid normal to palpation  RESP: lungs clear to auscultation - no rales, rhonchi or wheezes  CV: regular rate and rhythm, normal S1 S2, no S3 or S4, no murmur, click or rub, no peripheral edema and peripheral pulses strong  MS: no gross musculoskeletal defects noted, no edema, mild tenderness of the left thumb extensor tendon, with worsening pain on lateral deviation  PSYCH: mentation appears normal, affect normal/bright    Diagnostic Test Results:  none     ASSESSMENT/PLAN:         ICD-10-CM    1. Benign essential hypertension I10 hydrochlorothiazide 12.5 MG TABS tablet     Basic metabolic panel  (Ca, Cl, CO2, Creat, Gluc, K, Na, BUN)   2. Need for prophylactic " vaccination and inoculation against influenza Z23 FLU VACCINE, INCREASED ANTIGEN, PRESV FREE, AGE 65+ [62960]          ADMIN VACCINE, FIRST [48512]       Patient Instructions   Begin using aleve 220mg  1-2 pills twice a day with food  Continue icing 20 min three times a day  Wear wrist splint to rest the wrist     Begin the diuretic once daily in the morning  Reduce salt in your diet  Follow up in clinic in 2 weeks to recheck BP  And have blood work done      FIDEL Marquez [facility]

## 2017-10-16 ENCOUNTER — OFFICE VISIT (OUTPATIENT)
Dept: FAMILY MEDICINE | Facility: CLINIC | Age: 67
End: 2017-10-16
Payer: COMMERCIAL

## 2017-10-16 VITALS
SYSTOLIC BLOOD PRESSURE: 131 MMHG | OXYGEN SATURATION: 98 % | DIASTOLIC BLOOD PRESSURE: 77 MMHG | HEIGHT: 67 IN | HEART RATE: 77 BPM | WEIGHT: 152 LBS | BODY MASS INDEX: 23.86 KG/M2 | TEMPERATURE: 97.2 F

## 2017-10-16 DIAGNOSIS — E04.9 GOITER: Primary | ICD-10-CM

## 2017-10-16 DIAGNOSIS — I10 BENIGN ESSENTIAL HYPERTENSION: ICD-10-CM

## 2017-10-16 LAB
ANION GAP SERPL CALCULATED.3IONS-SCNC: 8 MMOL/L (ref 3–14)
BUN SERPL-MCNC: 17 MG/DL (ref 7–30)
CALCIUM SERPL-MCNC: 8.8 MG/DL (ref 8.5–10.1)
CHLORIDE SERPL-SCNC: 105 MMOL/L (ref 94–109)
CO2 SERPL-SCNC: 30 MMOL/L (ref 20–32)
CREAT SERPL-MCNC: 0.78 MG/DL (ref 0.52–1.04)
GFR SERPL CREATININE-BSD FRML MDRD: 74 ML/MIN/1.7M2
GLUCOSE SERPL-MCNC: 103 MG/DL (ref 70–99)
POTASSIUM SERPL-SCNC: 3.5 MMOL/L (ref 3.4–5.3)
SODIUM SERPL-SCNC: 143 MMOL/L (ref 133–144)
TSH SERPL DL<=0.005 MIU/L-ACNC: 0.59 MU/L (ref 0.4–4)

## 2017-10-16 PROCEDURE — 99213 OFFICE O/P EST LOW 20 MIN: CPT | Performed by: NURSE PRACTITIONER

## 2017-10-16 PROCEDURE — 84443 ASSAY THYROID STIM HORMONE: CPT | Performed by: NURSE PRACTITIONER

## 2017-10-16 PROCEDURE — 36415 COLL VENOUS BLD VENIPUNCTURE: CPT | Performed by: NURSE PRACTITIONER

## 2017-10-16 PROCEDURE — 80048 BASIC METABOLIC PNL TOTAL CA: CPT | Performed by: NURSE PRACTITIONER

## 2017-10-16 RX ORDER — HYDROCHLOROTHIAZIDE 12.5 MG/1
12.5 TABLET ORAL DAILY
Qty: 90 TABLET | Refills: 1 | Status: SHIPPED | OUTPATIENT
Start: 2017-10-16

## 2017-10-16 NOTE — MR AVS SNAPSHOT
"              After Visit Summary   10/16/2017    Ileana Cohen    MRN: [id number]           Patient Information     Date Of Birth          1950        Visit Information        Provider Department      10/16/2017 8:30 AM Jacqueline Titus APRN CNP [facility]        Today's Diagnoses     Goiter    -  1    Benign essential hypertension           Follow-ups after your visit        Follow-up notes from your care team     Return if symptoms worsen or fail to improve.      Who to contact     If you have questions or need follow up information about today's clinic visit or your schedule please contact [facility] directly at [phone].  Normal or non-critical lab and imaging results will be communicated to you by MyChart, letter or phone within 4 business days after the clinic has received the results. If you do not hear from us within 7 days, please contact the clinic through MyChart or phone. If you have a critical or abnormal lab result, we will notify you by phone as soon as possible.  Submit refill requests through AdGrok or call your pharmacy and they will forward the refill request to us. Please allow 3 business days for your refill to be completed.          Additional Information About Your Visit        MyChart Information     AdGrok lets you send messages to your doctor, view your test results, renew your prescriptions, schedule appointments and more. To sign up, go to [email or web address] . Click on \"Log in\" on the left side of the screen, which will take you to the Welcome page. Then click on \"Sign up Now\" on the right side of the page.     You will be asked to enter the access code listed below, as well as some personal information. Please follow the directions to create your username and password.     Your access code is: [email or web address]  Expires: 2017  1:46 PM     Your access code will  in 90 days. If you need help or a new code, please call your [place] " "clinic or [phone].        Care EveryWhere ID     This is your Care EveryWhere ID. This could be used by other organizations to access your [place] medical records  [id number]        Your Vitals Were     Pulse Temperature Height Pulse Oximetry BMI (Body Mass Index)       77 97.2  F (36.2  C) (Oral) 5' 7\" (1.702 m) 98% 23.81 kg/m2        Blood Pressure from Last 3 Encounters:   10/16/17 131/77   09/29/17 147/78   08/28/17 150/83    Weight from Last 3 Encounters:   10/16/17 152 lb (68.9 kg)   09/29/17 154 lb (69.9 kg)   08/28/17 151 lb 9.6 oz (68.8 kg)              We Performed the Following     Basic metabolic panel  (Ca, Cl, CO2, Creat, Gluc, K, Na, BUN)     TSH with free T4 reflex          Where to get your medicines      These medications were sent to [facility]/pharmacy #[id number] [place] [id number] [address]     Phone:  [phone]     hydrochlorothiazide 12.5 MG Tabs tablet          Primary Care Provider Office Phone # Fax #    Isaías Jay [phone]       [facility] [address] [place] [address] [address] [facility] [address]  [facility] [address]        Equal Access to Services     REANNA MORRIS AH: Hadii barbi ku hadasho Sobernardoali, waaxda luqadaha, qaybta kaalmada ademihiryanikki, katharine sanderson . So [facility] [place] [facility] [phone].    ATENCIÓN: Si habla español, tiene a linder disposición servicios gratuitos de asistencia lingüística. Llame al [phone].    We comply with applicable federal civil rights laws and [place] laws. We do not discriminate on the basis of race, color, national origin, age, disability, sex, sexual orientation, or gender identity.            Thank you!     Thank you for choosing [facility]  for your care. Our goal is always to provide you with excellent care. Hearing back from our patients is one way we can continue to improve our services. Please take a few minutes to complete the written survey that you may receive in the " mail after your visit with us. Thank you!             Your Updated Medication List - Protect others around you: Learn how to safely use, store and throw away your medicines at www.disposemymeds.org.          This list is accurate as of: 10/16/17  9:13 AM.  Always use your most recent med list.                   Brand Name Dispense Instructions for use Diagnosis    ALEVE PO           biotin 2.5 mg/mL Susp      Take by mouth daily        calcium carbonate 1250 MG tablet    OS-MIKE 500 mg [place]. Ca     Take 500 mg by mouth 2 times daily        hydrochlorothiazide 12.5 MG Tabs tablet     90 tablet    Take 1 tablet (12.5 mg) by mouth daily    Benign essential hypertension       ibuprofen 200 MG tablet    ADVIL/MOTRIN     Take 400 mg by mouth every 4 hours as needed for mild pain        Multi-vitamin Tabs tablet      Take 1 tablet by mouth daily

## 2017-10-16 NOTE — PROGRESS NOTES
Your kidney function and electrolytes are fine on the diuretic, Ileana.  And the TSH is in normal and stable range.

## 2017-10-16 NOTE — NURSING NOTE
"Chief Complaint   Patient presents with     RECHECK     Hypertension       Initial /77 (BP Location: Left arm, Cuff Size: Adult Regular)  Pulse 77  Temp 97.2  F (36.2  C) (Oral)  Ht 5' 7\" (1.702 m)  Wt 152 lb (68.9 kg)  SpO2 98%  BMI 23.81 kg/m2 Estimated body mass index is 23.81 kg/(m^2) as calculated from the following:    Height as of this encounter: 5' 7\" (1.702 m).    Weight as of this encounter: 152 lb (68.9 kg).  Medication Reconciliation: complete     Hina James MA    "

## 2017-10-16 NOTE — PROGRESS NOTES
SUBJECTIVE:   Ileana Cohen is a 67 year old female who presents to clinic today for the following health issues:      Hypertension Follow-up      Outpatient blood pressures are being checked at home.  Results are highest at 164, lowest is 119. On HCTZ 12.5mg feels well no side effect.  She get the high readings in the morning only when she arrives at her office after a steering wheel gripping drive.  This has also produced some tendonitis of the left thumb.  Wearing splint and taking ibuprofen 400mg 3-4 times a day also.      She finds her job extremely stressful and is leaving it in 2-3 months.  Plans to relocated to [place]      Low Salt Diet: not monitoring salt    Amount of exercise or physical activity: modestate    Problems taking medications regularly: No    Medication side effects: none  Diet: regular (no restrictions)      Problem list and histories reviewed & adjusted, as indicated.  Additional history: as documented    Patient Active Problem List   Diagnosis     Goiter     Primary osteoarthritis involving multiple joints     Contusion of rib, left, initial encounter     Past Surgical History:   Procedure Laterality Date     MAMMOPLASTY AUGMENTATION BILATERAL Bilateral 1994     tuabl ligation Bilateral        Social History   Substance Use Topics     Smoking status: Never Smoker     Smokeless tobacco: Never Used     Alcohol use 1.8 oz/week     3 Standard drinks or equivalent per week     Family History   Problem Relation Age of Onset     Hyperlipidemia Mother      Other Cancer Mother      OSTEOPOROSIS Mother      Thyroid Disease Mother      Other Cancer Father      Other Cancer Sister      Other Cancer Sister          Current Outpatient Prescriptions   Medication Sig Dispense Refill     hydrochlorothiazide 12.5 MG TABS tablet Take 1 tablet (12.5 mg) by mouth daily 90 tablet 1     ibuprofen (ADVIL/MOTRIN) 200 MG tablet Take 400 mg by mouth every 4 hours as needed for mild pain       Naproxen Sodium  "(ALEVE PO)        multivitamin, therapeutic with minerals (MULTI-VITAMIN) TABS tablet Take 1 tablet by mouth daily       calcium carbonate (OS-MIKE 500 MG [place]. CA) 500 MG tablet Take 500 mg by mouth 2 times daily       biotin 2.5 mg/mL Take by mouth daily       [DISCONTINUED] hydrochlorothiazide 12.5 MG TABS tablet Take 1 tablet (12.5 mg) by mouth daily 30 tablet 1     Allergies   Allergen Reactions     Latex Rash         Reviewed and updated as needed this visit by clinical staff     Reviewed and updated as needed this visit by Provider         ROS:  Constitutional, HEENT, cardiovascular, pulmonary, gi and gu systems are negative, except as otherwise noted.      OBJECTIVE:   /77 (BP Location: Left arm, Cuff Size: Adult Regular)  Pulse 77  Temp 97.2  F (36.2  C) (Oral)  Ht 5' 7\" (1.702 m)  Wt 152 lb (68.9 kg)  SpO2 98%  BMI 23.81 kg/m2  Body mass index is 23.81 kg/(m^2).  GENERAL: healthy, alert and no distress  RESP: lungs clear to auscultation - no rales, rhonchi or wheezes  CV: regular rate and rhythm, normal S1 S2, no S3 or S4, no murmur, click or rub, no peripheral edema and peripheral pulses strong  MS: no gross musculoskeletal defects noted, no edema    Diagnostic Test Results:  none     ASSESSMENT/PLAN:         ICD-10-CM    1. Goiter E04.9 TSH with free T4 reflex   2. Benign essential hypertension I10 hydrochlorothiazide 12.5 MG TABS tablet     Basic metabolic panel  (Ca, Cl, CO2, Creat, Gluc, K, Na, BUN)   continue current regimen.  High morning readings obviously stress related and taken before diuretic effect.  Ibuprofen may also be contributing to some higher readings.  Likely BP will normalize once she has left her current employment but she will continue HCTZ until after she has establishe with new PCP in [place]     FIDEL Marquez [facility]  "

## 2017-10-16 NOTE — LETTER
[facility]  [address] [facility]  [address]  [place]  [address]  Tel: [phone]    October 17, 2017    Ileana Cohen  [address] [place] [address]        Dear Ms. Cohen,    Your kidney function and electrolytes are fine on the diuretic, Ileana.  And the TSH is in normal and stable range.    If you have any further questions or problems, please contact our office.      Sincerely,    Jacqueline Titus NP/ Jessica MOE CMA  Results for orders placed or performed in visit on 10/16/17   Basic metabolic panel  (Ca, Cl, CO2, Creat, Gluc, K, Na, BUN)   Result Value Ref Range    Sodium 143 133 - 144 mmol/L    Potassium 3.5 3.4 - 5.3 mmol/L    Chloride 105 94 - 109 mmol/L    Carbon Dioxide 30 20 - 32 mmol/L    Anion Gap 8 3 - 14 mmol/L    Glucose 103 (H) 70 - 99 mg/dL    Urea Nitrogen 17 7 - 30 mg/dL    Creatinine 0.78 0.52 - 1.04 mg/dL    GFR Estimate 74 >60 mL/min/1.7m2    GFR Estimate If Black 90 >60 mL/min/1.7m2    Calcium 8.8 8.5 - 10.1 mg/dL   TSH with free T4 reflex   Result Value Ref Range    TSH 0.59 0.40 - 4.00 mU/L               Enclosure: Lab Results